# Patient Record
Sex: MALE | Race: WHITE | Employment: FULL TIME | URBAN - METROPOLITAN AREA
[De-identification: names, ages, dates, MRNs, and addresses within clinical notes are randomized per-mention and may not be internally consistent; named-entity substitution may affect disease eponyms.]

---

## 2021-07-07 ENCOUNTER — HOSPITAL ENCOUNTER (OUTPATIENT)
Dept: PHYSICAL THERAPY | Age: 51
Discharge: HOME | End: 2021-07-07
Payer: COMMERCIAL

## 2021-07-07 PROCEDURE — 97530 THERAPEUTIC ACTIVITIES: CPT

## 2021-07-07 PROCEDURE — 97161 PT EVAL LOW COMPLEX 20 MIN: CPT

## 2021-07-07 PROCEDURE — 97110 THERAPEUTIC EXERCISES: CPT

## 2021-07-07 NOTE — PROGRESS NOTES
[facility] at the [address], [place] Yovany hassan, [address] [facility]  Phone: [phone]      Fax:  [phone]       Plan of Care/ Statement of Necessity for Physical Therapy Services      Patient name: Amos Santos Start of Care: 2021   Referral source: Referred, Self, MD : 1970    Medical Diagnosis: Right shoulder pain [M25.511]   Onset Date:21    Treatment Diagnosis: Right Shoulder Pain    Prior Hospitalization: see medical history Provider#: [id number]   Medications: Verified on Patient summary List   Comorbidities/PMHx/Surgical Hx:  GERD (taking medication); tourn right knee ACL in  (not fixed)   Prior level of function: functionally independent, no AD, relatively active lifestyle outside of work   Work      [employer] of [facility] and following information is based on the information from the initial evaluation. Assessment/ key information: Patient is a 52 yo male who presents to [address] [place] [address] at [address] [place] [address] with c/o an insidious onset of right shoulder pain on 21. 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 Patient reports right shoulder pinpointed pain on the anterior and posterior aspect of his shoulder with rest and reduction of pain with movement. He was negative for a variety of rotator cuff and long head of bicep tendon special tests. He reported postive test with Spurling's to the right and IR lag sign. He demo significant muscle tightness in his anterior shoulder muscles and UT. Patient demonstrates decreased ROM (Bilateral IR and ER), impaired posture, and pain that occasionally wakes him up at night. The pt possibly has nerve impingement via cervical spine causing referred pain to shoulder impacted by posture.   Patient would benefit from skilled PT services to modify and progress therapeutic interventions, address ROM deficits, analyze and address soft tissue restrictions, analyze and cue movement patterns, analyze and modify body mechanics/ergonomics and assess and modify postural abnormalities to attain remaining goals. Evaluation Complexity History MEDIUM  Complexity : 1-2 comorbidities / personal factors will impact the outcome/ POC ; Examination HIGH Complexity : 4+ Standardized tests and measures addressing body structure, function, activity limitation and / or participation in recreation  ;Presentation MEDIUM Complexity : Evolving with changing characteristics  ; Clinical Decision Making LOW Complexity : FOTO score of   Overall Complexity Rating: LOW   Problem List: decrease ROM and decrease flexibility/ joint mobility   Treatment Plan may include any combination of the following: Therapeutic exercise, Therapeutic activities, Neuromuscular re-education, Physical agent/modality, Manual therapy and Patient education  Patient / Family readiness to learn indicated by: asking questions, trying to perform skills and interest  Persons(s) to be included in education: patient (P)  Barriers to Learning/Limitations: None  Patient Goal (s): no pain  Patient Self Reported Health Status: excellent  Rehabilitation Potential: excellent    Short Term Goals: To be accomplished in 2 weeks: 1. Patient will report compliance with HEP at least 1x/day to aid in rehabilitation program.              Status at IE: provided and reviewed HEP with pt                 2.Patient will display full IR/ER bilateral pain free AROM into >85 deg to aid in completion of work related tasks with improved functional mobility. Status at IE: Left shoulder: IR = 70, ER = 72; Right shoulder: IR = 70, ER= 73        Long Term Goals: To be accomplished in 6 weeks: 1. Patient will report compliance with HEP a least 3-4x/week to aid in rehabilitation/strengthening program.              Status at IE: Provided HEP and reviewed with pt     2. Patient will report 0/10 pain for all home related and work related activities to return to PLOF. Status at IE: worst pain = 4/10 at rest (0/10 with movement)                  3.Patient will be negative for the IR lag sign and Spurling Special Tests to perform daily activities with decreased pain and symptom levels              Status at IE: Pt reported discomfort with IR lag sign and Spurling's Special Test to the right with referred pain to shoulder    Frequency / Duration: Patient to be seen 2 times per week for 4 weeks. Patient/ Caregiver education and instruction: Diagnosis, prognosis, self care, activity modification and exercises   [x]  Plan of care has been reviewed with PTA    Pal Hansen, PT 7/7/2021 2:53 PM  _____________________________________________________________________  I certify that the above Therapy Services are being furnished while the patient is under my care. I agree with the treatment plan and certify that this therapy is necessary.     Physician's Signature:____________Date:_________TIME:________                                      Referred, Self, MD    ** Signature, Date and Time must be completed for valid certification **    Please sign and return to [facility] at the [address], [address] [facility]       Phone: [phone]      Fax:  [phone]

## 2021-07-07 NOTE — PROGRESS NOTES
PT DAILY TREATMENT NOTE    Patient Name: Alfredo Isidro  Date:2021  : 1970  [x]  Patient  Verified  Payor: BLUE CROSS / Plan: Santa Ramos [id number] PPO / Product Type: PPO /    In time:12:20pm  Out time:1:20pm  Total Treatment Time (min): 60  Total Timed Codes (min):19  Visit #: 1 of 8    Treatment Area: Right shoulder pain [M25.511]    SUBJECTIVE  Pain Level (0-10 scale): 2/10    Any medication changes, allergies to medications, adverse drug reactions, diagnosis change, or new procedure performed?: [x] No    [] Yes (see summary sheet for update)  Subjective functional status/changes:   HPI:  The pt reports to therapy with c/o right shoulder pain starting on 21 (insidious onset)   - initial pain was in superior scapular pain   - Worst pain after work or standing/sitting (arm relaxing and resting by side)   - pin pointed pain on the anterior and posterior side of the right prox shoulder  - no limitation to function (able to continue working)   - able to complete all ADLS/IADLs   - 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; currently able to lift greater than 50lbs no pain  - right forearm pain (prox brachial radialis)      Pain description:    []Constant [x]Intermittent []Achy []Sharp [x]Dull []Burning []tingling  Current symptoms/Complaints: 0/10 at best with moving; 4/10 at worst with rest; pt reports they are not able to sleep through the night secondary to pain   Comorbidities/PMHx/Surgical Hx:  GERD (taking medication); tourn right knee ACL in  (not fixed)   Prior level of function: functionally independent, no AD, relatively active lifestyle outside of work   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)  Living Situation: Lives with family (wife and 2 teenage kids); 2 story house (1st floor master bedroom)   Pt Goals: \"no pain\"   Barriers: []pain []financial [x]time []transportation []other  Substance use: [x]Alcohol (occasional)  [x]Tobacco (nicotine use) []other:   Cognition: A & O x 3        OBJECTIVE      41 min [x]Eval                  []Re-Eval       9 min Therapeutic Exercise:  [] See flow sheet :   Rationale: Pt education of HEP    10 min Therapeutic Activity:  []  See flow sheet :   Rationale: pt education of POC, anatomy, and results of assessment           With   [x] TE   [] TA   [] neuro   [] other: Patient Education: [x] Review HEP    [] Progressed/Changed HEP based on:   [] positioning   [] body mechanics   [] transfers   [] heat/ice application    [] other:        General Evaluation  Observation: Pt enters gym in no apparent distress. Posture: forward flexed shoulders  Palpation: light pain upon palpation of prox right bicep tendon, and at the right prox anterior/posterior shoulder; tight anterior shoulder muscles and UT    ROM: all shoulder ROM WFL except Left shoulder: IR = 70, ER = 72; Right shoulder: IR = 70, ER= 73          Strength (MMT):  Shoulder L (1-5) R (1-5)   Shoulder Flexion 5 5   Shoulder Ext 5 5   Shoulder ABD 5 5   Shoulder ADD 5 5   Shoulder IR 5 5   Shoulder ER 5 5   Elbow Flexion 5 5   Elbow Extension 5 5                     Special Tests:  Shoulder SAPS cluster Left Right   Cox-Sharad  NEG   Empty Can (flexion, scap., ABD)  NEG   Painful arch  NEG   Resisted external rotation  NEG     Other special tests:  Spurling's: Positive on the right side (right shoulder)   Bicep Load test: NEG  Speed's test: right shoulder NEG  Drop arm sign: NEG  Internal rotation lag sign: Positive     Other Tests / Comments:     FOTO = 100 (risk adjusted score = 51)     Pain Level (0-10 scale) post treatment: 0/10    ASSESSMENT/Changes in Function: Patient is a 54 yo male who presents to [address] [place] [address] at [address] [place] [address] with c/o an insidious onset of right shoulder pain on 7/2/21. 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  Patient reports right shoulder pinpointed pain on the anterior and posterior aspect of his shoulder with rest and reduction of pain with movement. He was negative for a variety of rotator cuff and long head of bicep tendon special tests. He reported postive test with Spurling's to the right and IR lag sign. He demo significant muscle tightness in his anterior shoulder muscles and UT. Patient demonstrates decreased ROM (Bilateral IR and ER), impaired posture, and pain that occasionally wakes him up at night. The pt possibly has nerve impingement via cervical spine causing referred pain to shoulder impacted by posture. Patient would benefit from skilled PT services to modify and progress therapeutic interventions, address ROM deficits, analyze and address soft tissue restrictions, analyze and cue movement patterns, analyze and modify body mechanics/ergonomics and assess and modify postural abnormalities to attain remaining goals. [x]  See Plan of Care  []  See progress note/recertification  []  See Discharge Summary          Progress towards goals / Updated goals:  Short Term Goals: To be accomplished in 2 weeks: 1. Patient will report compliance with HEP at least 1x/day to aid in rehabilitation program.   Status at IE: provided and reviewed HEP with pt     2. Patient will display full IR/ER bilateral pain free AROM into >85 deg to aid in completion of work related tasks with improved functional mobility. Status at IE: Left shoulder: IR = 70, ER = 72; Right shoulder: IR = 70, ER= 73      Long Term Goals: To be accomplished in 6 weeks: 1. Patient will report compliance with HEP a least 3-4x/week to aid in rehabilitation/strengthening program.   Status at IE: Provided HEP and reviewed with pt    2. Patient will report 0/10 pain for all home related and work related activities to return to PLOF. Status at IE: worst pain = 4/10 at rest (0/10 with movement)      3. Patient will be negative for the IR lag sign and Spurling Special Tests to perform daily activities with decreased pain and symptom levels   Status at IE: Pt reported discomfort with IR lag sign and Spurling's Special Test to the right with referred pain to shoulder    PLAN  []  Upgrade activities as tolerated     [x]  Continue plan of care  []  Update interventions per flow sheet       []  Discharge due to:_  []  Other:_      Sonia Cole, PT 7/7/2021  12:21 PM    No future appointments.

## 2021-07-14 ENCOUNTER — HOSPITAL ENCOUNTER (OUTPATIENT)
Dept: PHYSICAL THERAPY | Age: 51
Discharge: HOME | End: 2021-07-14
Payer: COMMERCIAL

## 2021-07-14 PROCEDURE — 97110 THERAPEUTIC EXERCISES: CPT

## 2021-07-14 PROCEDURE — 97530 THERAPEUTIC ACTIVITIES: CPT

## 2021-07-14 PROCEDURE — 97112 NEUROMUSCULAR REEDUCATION: CPT

## 2021-07-14 NOTE — PROGRESS NOTES
PT DAILY TREATMENT NOTE    Patient Name: Lauren Murrieta  Date:2021  : 1970  [x]  Patient  Verified  Payor: BLUE CROSS / Plan: Santa Ramos [id number] PPO / Product Type: PPO /    In time:225  Out time:316  Total Treatment Time (min): 51  Total Timed Codes (min): 51  1:1 Treatment Time (MC/BCBS only): 51    Visit #: 2 of 8    Treatment Area: Right shoulder pain [M25.511]    SUBJECTIVE  Pain Level (0-10 scale): 0  Any medication changes, allergies to medications, adverse drug reactions, diagnosis change, or new procedure performed?: [x] No    [] Yes (see summary sheet for update)  Subjective functional status/changes:   [] No changes reported  Patient resolved that shoulder pain has been resolved. Wishes to be discharged.     OBJECTIVE          25 min Therapeutic Exercise:  [x] See flow sheet :   Rationale: increase ROM, increase strength and improve coordination to improve the patients ability to perform ADL    10 min Therapeutic Activity:  [x]  See flow sheet :review of updated HEP, proper carrying of loads   Rationale: increase ROM, increase strength and improve coordination  to improve the patients ability to perform ADL without injury     16 min Neuromuscular Re-education:  [x]  See flow sheet :facilitation of proper breathing , rib cage mobility, improved LS stabilization to improve shoulder girdle mechanics   Rationale: increase ROM, increase strength, improve coordination and increase proprioception  to improve the patients ability to perform ADL with good form            With   [x] TE   [] TA   [] neuro   [] other: Patient Education: [x] Review HEP    [] Progressed/Changed HEP based on:   [] positioning   [] body mechanics   [] transfers   [] heat/ice application    [] other:      Other Objective/Functional Measures:   Empty can test right minimal discomfort  Shoulder Flex 160 without LS compensation  HGIR pre/post left 80/90, right 70/90  HGER left 70/85, right 90/90  Horizontal ABD left 30, right 40  Trunk rotation pre/post left 18/16 in, right 18/16.5 in  SLR both 70 deg  Mild core strength deficit, difficulty to isolate PPT however improved with VC  No pain with activities           Pain Level (0-10 scale) post treatment: 0    ASSESSMENT/Changes in Function: right shoulder pain resolved. Patient was instructed in advanced HEP to address postural alignment and strength deficit in core/RC to prevent future injury. [x]  See discharge report           Progress towards goals / Updated goals:  Short Term Goals: To be accomplished in 2 weeks: 1. Patient will report compliance with HEP at least 1x/day to aid in rehabilitation program.              Status at IE: provided and reviewed HEP with pt  Status on 7/14/21: review of updated HEP, patient compliant , MET                 2.Patient will display full IR/ER bilateral pain free AROM into >85 deg to aid in completion of work related tasks with improved functional mobility.               Status at IE: Left shoulder: IR = 70, ER = 72; Right shoulder: IR = 70, ER= 73  Status on 7/14/21: HGIR left 90, right 90, HGER left 85, right 90, MET        Long Term Goals: To be accomplished in 6 weeks: 1. Patient will report compliance with HEP a least 3-4x/week to aid in rehabilitation/strengthening program.              XHAEJK at IE: Provided HEP and reviewed with pt  Status on 7/14/21: patient has good understanding of advanced updated HEP, MET     2.Patient will report 0/10 pain for all home related and work related activities to return to PLOF.                Status at IE: worst pain = 4/10 at rest (0/10 with movement)  Status on 7/14/21: 0/10 right shoulder pain, symptoms resolved with all ADL, MET                  3.Patient will be negative for the IR lag sign and Spurling Special Tests to perform daily activities with decreased pain and symptom levels              Status at IE: Pt reported discomfort with IR lag sign and Spurling's Special Test to the right with referred pain to shoulder  Status on 7/14/21: Negative IR lag sign, no pain, Spurling's NT, no reports of CS pain or dysfunction, MET       PLAN        [x]  Discharge due to:symptoms resolved , patient having met all goals  []  Other:_      Catalino Zhang, PT 7/14/2021  2:03 PM    Future Appointments   Date Time Provider [place]   7/14/2021  2:15 PM Nura Mane, PT [facility]   7/19/2021  1:30 PM SHAYNA Cortez [facility]

## 2021-07-14 NOTE — PROGRESS NOTES
[facility] at the [address], [place]an, [address] [facility]  Phone: [phone]      Fax:  [phone]    Discharge Summary    Patient name: Pool Paredes Start of Care: 2021   Referral source: Referred, MD Homero : 1970               Medical Diagnosis: Right shoulder pain [M25.511]    Onset Date:21               Treatment Diagnosis: Right Shoulder Pain    Prior Hospitalization: see medical history Provider#: [id number]   Medications: Verified on Patient summary List   Comorbidities/PMHx/Surgical Hx:  GERD (taking medication); tourn right knee ACL in  (not fixed)   Prior level of function: functionally independent, no AD, relatively active lifestyle outside of work   Work      Visits from [place] of Care: 2    Missed Visits: 0  Reporting Period : 21 to 21                Progress towards goals / Updated goals:  Short Term Goals: To be accomplished in 2 weeks: 1. Patient will report compliance with HEP at least 1x/day to aid in rehabilitation program.              Status at IE: provided and reviewed HEP with pt  Status on 21: review of updated HEP, patient compliant , MET                 2.Patient will display full IR/ER bilateral pain free AROM into >85 deg to aid in completion of work related tasks with improved functional mobility.               Status at IE: Left shoulder: IR = 70, ER = 72; Right shoulder: IR = 70, ER= 73  Status on 21: HGIR left 90, right 90, HGER left 85, right 90, MET        Long Term Goals: To be accomplished in 6 weeks: 1. Patient will report compliance with HEP a least 3-4x/week to aid in rehabilitation/strengthening program.              WYYXPS at IE: Provided HEP and reviewed with pt  Status on 21: patient has good understanding of advanced updated HEP, MET     2.Patient will report 0/10 pain for all home related and work related activities to return to PLOF.                Status at IE: worst pain = 4/10 at rest (0/10 with movement)  Status on 7/14/21: 0/10 right shoulder pain, symptoms resolved with all ADL, MET                  3.Patient will be negative for the IR lag sign and Spurling Special Tests to perform daily activities with decreased pain and symptom levels              Status at IE: Pt reported discomfort with IR lag sign and Spurling's Special Test to the right with referred pain to shoulder  Status on 7/14/21: Negative IR lag sign, no pain, Spurling's NT, no reports of CS pain or dysfunction, MET       Assessment/ Summary of Care: Mr. Esmer Mendoza presented during his 2nd visit with resolved symptoms of his right shoulder. He has met all goals, returned to full function without shoulder pain and has been instructed in an advanced HEP to address postural deficits for more optimal function and injury prevention.      RECOMMENDATIONS:  [x]Discontinue therapy: [x]Patient has reached or is progressing toward set goals        Sarita Mckenna, PT 7/14/2021 3:35 PM

## 2021-07-19 ENCOUNTER — APPOINTMENT (OUTPATIENT)
Dept: PHYSICAL THERAPY | Age: 51
End: 2021-07-19
Payer: COMMERCIAL